# Patient Record
Sex: FEMALE | Race: WHITE | NOT HISPANIC OR LATINO | Employment: PART TIME | URBAN - METROPOLITAN AREA
[De-identification: names, ages, dates, MRNs, and addresses within clinical notes are randomized per-mention and may not be internally consistent; named-entity substitution may affect disease eponyms.]

---

## 2018-02-28 ENCOUNTER — LAB REQUISITION (OUTPATIENT)
Dept: LAB | Facility: HOSPITAL | Age: 43
End: 2018-02-28

## 2018-02-28 DIAGNOSIS — Z00.00 ROUTINE GENERAL MEDICAL EXAMINATION AT A HEALTH CARE FACILITY: ICD-10-CM

## 2018-03-07 ENCOUNTER — OFFICE VISIT (OUTPATIENT)
Dept: FAMILY MEDICINE CLINIC | Facility: CLINIC | Age: 43
End: 2018-03-07

## 2018-03-07 VITALS
TEMPERATURE: 98.5 F | SYSTOLIC BLOOD PRESSURE: 121 MMHG | OXYGEN SATURATION: 98 % | WEIGHT: 189.6 LBS | HEIGHT: 65 IN | HEART RATE: 81 BPM | DIASTOLIC BLOOD PRESSURE: 80 MMHG | RESPIRATION RATE: 14 BRPM | BODY MASS INDEX: 31.59 KG/M2

## 2018-03-07 DIAGNOSIS — Z00.00 ROUTINE GENERAL MEDICAL EXAMINATION AT A HEALTH CARE FACILITY: Primary | ICD-10-CM

## 2018-03-07 PROBLEM — J45.40 MODERATE PERSISTENT ASTHMA WITHOUT COMPLICATION: Status: ACTIVE | Noted: 2018-03-07

## 2018-03-07 PROCEDURE — 99396 PREV VISIT EST AGE 40-64: CPT | Performed by: PHYSICIAN ASSISTANT

## 2018-03-07 PROCEDURE — 93000 ELECTROCARDIOGRAM COMPLETE: CPT | Performed by: PHYSICIAN ASSISTANT

## 2018-03-07 NOTE — PROGRESS NOTES
Subjective   Geno Jimenez is a 43 y.o. female  Annual Exam (Pt is fasting. UTD on Pap, due for MMG. )      History of Present Illness  Patient is a pleasant 43-year-old white female comes in for full preventive medical examination.  Denies any new problems or complaints.  Does use Advair for asthma  The following portions of the patient's history were reviewed and updated as appropriate: allergies, current medications, past social history and problem list    Review of Systems   Constitutional: Negative.    HENT: Negative.    Eyes: Negative.    Respiratory: Negative.    Cardiovascular: Negative.    Gastrointestinal: Negative.    Endocrine: Negative.    Genitourinary: Negative.    Musculoskeletal: Negative.    Skin: Negative.    Allergic/Immunologic: Negative.    Neurological: Negative.    Hematological: Negative.    Psychiatric/Behavioral: Negative.    All other systems reviewed and are negative.      Objective     Vitals:    03/07/18 0943   BP: 121/80   Pulse: 81   Resp: 14   Temp: 98.5 °F (36.9 °C)   SpO2: 98%       Physical Exam   Constitutional: She is oriented to person, place, and time. She appears well-developed and well-nourished.   HENT:   Head: Normocephalic and atraumatic.   Right Ear: External ear normal.   Left Ear: External ear normal.   Nose: Nose normal.   Mouth/Throat: Oropharynx is clear and moist.   Eyes: Conjunctivae and EOM are normal. Pupils are equal, round, and reactive to light.   Neck: Normal range of motion. Neck supple. No JVD present. Carotid bruit is not present. No thyromegaly present.   Cardiovascular: Normal rate, regular rhythm, normal heart sounds and intact distal pulses.    No murmur heard.  Pulmonary/Chest: Effort normal and breath sounds normal.   Abdominal: Soft. Bowel sounds are normal. She exhibits no mass. There is no tenderness.   Musculoskeletal: Normal range of motion. She exhibits no edema.   Lymphadenopathy:     She has no cervical adenopathy.   Neurological: She  is alert and oriented to person, place, and time. She has normal reflexes. No cranial nerve deficit.   Skin: Skin is warm and dry.   Psychiatric: She has a normal mood and affect.   Nursing note and vitals reviewed.    ECG 12 Lead  Date/Time: 3/7/2018 10:17 AM  Performed by: NANNETTE WILLAMS  Authorized by: NANNETTE WILLAMS   Comparison: not compared with previous ECG   Rhythm: sinus rhythm  Conduction: conduction normal  ST Segments: ST segments normal  T Waves: T waves normal  Clinical impression: normal ECG  Comments: nsr            Assessment/Plan     Diagnoses and all orders for this visit:    Routine general medical examination at a health care facility  -     CBC & Differential  -     TSH  -     Comprehensive Metabolic Panel  -     Lipid Panel  -     Vitamin D 1,25 Dihydroxy  Preventive medicine discussed, diet, exercise healthy living discussed.  Discussed ways improve diet and exercise discussed low-calorie low-carb diet along with exercise 3-5 times per week to give prescription for phentermine 37.5 mg 1 by mouth everyday dispense 30.  And weight loss goal of 10% of her body weight

## 2018-03-10 ENCOUNTER — APPOINTMENT (OUTPATIENT)
Dept: LAB | Facility: HOSPITAL | Age: 43
End: 2018-03-10

## 2018-03-10 LAB
ALBUMIN SERPL-MCNC: 4.7 G/DL (ref 3.2–4.8)
ALBUMIN/GLOB SERPL: 1.5 G/DL (ref 1.5–2.5)
ALP SERPL-CCNC: 87 U/L (ref 25–100)
ALT SERPL W P-5'-P-CCNC: 28 U/L (ref 7–40)
ANION GAP SERPL CALCULATED.3IONS-SCNC: 7 MMOL/L (ref 3–11)
ARTICHOKE IGE QN: 171 MG/DL (ref 0–130)
AST SERPL-CCNC: 32 U/L (ref 0–33)
BASOPHILS # BLD AUTO: 0.03 10*3/MM3 (ref 0–0.2)
BASOPHILS NFR BLD AUTO: 0.4 % (ref 0–1)
BILIRUB SERPL-MCNC: 0.7 MG/DL (ref 0.3–1.2)
BUN BLD-MCNC: 10 MG/DL (ref 9–23)
BUN/CREAT SERPL: 11.1 (ref 7–25)
CALCIUM SPEC-SCNC: 9.3 MG/DL (ref 8.7–10.4)
CHLORIDE SERPL-SCNC: 103 MMOL/L (ref 99–109)
CHOLEST SERPL-MCNC: 240 MG/DL (ref 0–200)
CO2 SERPL-SCNC: 27 MMOL/L (ref 20–31)
CREAT BLD-MCNC: 0.9 MG/DL (ref 0.6–1.3)
DEPRECATED RDW RBC AUTO: 42.6 FL (ref 37–54)
EOSINOPHIL # BLD AUTO: 0.22 10*3/MM3 (ref 0–0.3)
EOSINOPHIL NFR BLD AUTO: 2.6 % (ref 0–3)
ERYTHROCYTE [DISTWIDTH] IN BLOOD BY AUTOMATED COUNT: 12.5 % (ref 11.3–14.5)
GFR SERPL CREATININE-BSD FRML MDRD: 68 ML/MIN/1.73
GLOBULIN UR ELPH-MCNC: 3.2 GM/DL
GLUCOSE BLD-MCNC: 110 MG/DL (ref 70–100)
HCT VFR BLD AUTO: 43.4 % (ref 34.5–44)
HDLC SERPL-MCNC: 72 MG/DL (ref 40–60)
HGB BLD-MCNC: 14.6 G/DL (ref 11.5–15.5)
IMM GRANULOCYTES # BLD: 0.02 10*3/MM3 (ref 0–0.03)
IMM GRANULOCYTES NFR BLD: 0.2 % (ref 0–0.6)
LYMPHOCYTES # BLD AUTO: 2.14 10*3/MM3 (ref 0.6–4.8)
LYMPHOCYTES NFR BLD AUTO: 25.2 % (ref 24–44)
MCH RBC QN AUTO: 31.3 PG (ref 27–31)
MCHC RBC AUTO-ENTMCNC: 33.6 G/DL (ref 32–36)
MCV RBC AUTO: 92.9 FL (ref 80–99)
MONOCYTES # BLD AUTO: 0.78 10*3/MM3 (ref 0–1)
MONOCYTES NFR BLD AUTO: 9.2 % (ref 0–12)
NEUTROPHILS # BLD AUTO: 5.29 10*3/MM3 (ref 1.5–8.3)
NEUTROPHILS NFR BLD AUTO: 62.4 % (ref 41–71)
PLATELET # BLD AUTO: 343 10*3/MM3 (ref 150–450)
PMV BLD AUTO: 10 FL (ref 6–12)
POTASSIUM BLD-SCNC: 4 MMOL/L (ref 3.5–5.5)
PROT SERPL-MCNC: 7.9 G/DL (ref 5.7–8.2)
RBC # BLD AUTO: 4.67 10*6/MM3 (ref 3.89–5.14)
SODIUM BLD-SCNC: 137 MMOL/L (ref 132–146)
TRIGL SERPL-MCNC: 56 MG/DL (ref 0–150)
TSH SERPL DL<=0.05 MIU/L-ACNC: 1.35 MIU/ML (ref 0.35–5.35)
WBC NRBC COR # BLD: 8.48 10*3/MM3 (ref 3.5–10.8)

## 2018-03-10 PROCEDURE — 80061 LIPID PANEL: CPT | Performed by: PHYSICIAN ASSISTANT

## 2018-03-10 PROCEDURE — 80053 COMPREHEN METABOLIC PANEL: CPT | Performed by: PHYSICIAN ASSISTANT

## 2018-03-10 PROCEDURE — 84443 ASSAY THYROID STIM HORMONE: CPT | Performed by: PHYSICIAN ASSISTANT

## 2018-03-10 PROCEDURE — 85025 COMPLETE CBC W/AUTO DIFF WBC: CPT | Performed by: PHYSICIAN ASSISTANT

## 2018-03-10 PROCEDURE — 36415 COLL VENOUS BLD VENIPUNCTURE: CPT | Performed by: PHYSICIAN ASSISTANT

## 2018-03-10 PROCEDURE — 82652 VIT D 1 25-DIHYDROXY: CPT | Performed by: PHYSICIAN ASSISTANT

## 2018-03-12 LAB — 1,25(OH)2D3 SERPL-MCNC: 67.5 PG/ML (ref 19.9–79.3)

## 2019-11-21 ENCOUNTER — TELEPHONE (OUTPATIENT)
Dept: FAMILY MEDICINE CLINIC | Facility: CLINIC | Age: 44
End: 2019-11-21

## 2019-11-21 NOTE — TELEPHONE ENCOUNTER
Patient's  called to request for a new, updated prescription for the patient for the ADVAIR DISKUS 250-50 MCG/DOSE DISKUS to be sent to the Department of [facility] on [address] in [place], [place]. The patient's  stated that they are currently stationed there and the patient is almost out of her inhaler. If there are any questions or concerns please call the patient's  back at [phone] or the patient at [phone] or the pharmacy at [facility] at [phone]. The patient's  requested a call at [phone] when this message has been received and the prescription has been sent to the pharmacy so that he knows that it is there.

## 2020-02-28 NOTE — TELEPHONE ENCOUNTER
PT CALLED REQUESTING  A 12 MONTH REFILL ON fluticasone-salmeterol (ADVAIR DISKUS) 250-50 MCG/DOSE DISKUS.        PHARMACY CONFIRMED- [facility]        PLEASE CALL  AND ADVISE PT @ [phone]

## 2020-07-23 ENCOUNTER — TELEPHONE (OUTPATIENT)
Dept: FAMILY MEDICINE CLINIC | Facility: CLINIC | Age: 45
End: 2020-07-23

## 2020-07-23 NOTE — TELEPHONE ENCOUNTER
Pt's  called to request an order for Advair not the generic     fluticasone-salmeterol (ADVAIR DISKUS) 250-50 MCG/DOSE DISKUS    Pt's insurance stated that pcp will need to send a new order       [facility]   PH: [phone]  FAX: [phone]

## 2021-12-03 NOTE — TELEPHONE ENCOUNTER
Caller: Binu Jimenez    Relationship: Emergency Contact    Best call back number: [phone]    Requested Prescriptions:   Requested Prescriptions     Pending Prescriptions Disp Refills   • Advair Diskus 250-50 MCG/DOSE DISKUS 180 each 3     Sig: Inhale 1 puff 2 (Two) Times a Day.        Pharmacy where request should be sent: [facility] [address] [phone] [facility] [phone]      Additional details provided by patient: PATIENTS PRESCRIPTION HAS . PATIENTS PHARMACY NEEDS A NEW PRESCRIPTION FOR HER YEARLY SUPPLY     Does the patient have less than a 3 day supply:  [x] Yes  [] No    Aravind Agosto Rep   21 09:57 EST

## 2023-03-24 RX ORDER — FLUTICASONE PROPIONATE AND SALMETEROL 250; 50 UG/1; UG/1
1 POWDER RESPIRATORY (INHALATION) 2 TIMES DAILY
Qty: 180 EACH | Refills: 3 | OUTPATIENT
Start: 2023-03-24

## 2023-03-24 NOTE — TELEPHONE ENCOUNTER
Caller: Jimbo Jimenezy    Relationship: Emergency Contact    Best call back number: [phone]    Requested Prescriptions:   Requested Prescriptions     Pending Prescriptions Disp Refills   • Fluticasone-Salmeterol (Advair Diskus) 250-50 MCG/ACT DISKUS 180 each 3     Sig: Inhale 1 puff 2 (Two) Times a Day.        Pharmacy where request should be sent: [address] [phone] [facility] [phone]      Last office visit with prescribing clinician: Visit date not found   Last telemedicine visit with prescribing clinician: Visit date not found   Next office visit with prescribing clinician: Visit date not found     Additional details provided by patient:     Does the patient have less than a 3 day supply:  [] Yes  [x] No    Would you like a call back once the refill request has been completed: [] Yes [x] No    If the office needs to give you a call back, can they leave a voicemail: [x] Yes [] No    Cadance Dunaway, RegSched Rep   03/24/23 12:19 EDT

## 2023-04-12 NOTE — TELEPHONE ENCOUNTER
Caller: Binu Jimenez    Relationship: Emergency Contact    Best call back number: [phone]    Requested Prescriptions:   Requested Prescriptions     Pending Prescriptions Disp Refills   • Fluticasone-Salmeterol (Advair Diskus) 250-50 MCG/ACT DISKUS 180 each 3     Sig: Inhale 1 puff 2 (Two) Times a Day.        Pharmacy where request should be sent: [facility] - [address] [phone] [facility] [phone]      Last office visit with prescribing clinician: Visit date not found   Last telemedicine visit with prescribing clinician: Visit date not found   Next office visit with prescribing clinician: Visit date not found     Additional details provided by patient: PATIENTS  IS CALLING AND STATES THEY HAVE TRIED TO GET THIS REFILLED BUT WAS TOLD SHE HAS TO HAVE AN IN PERSON VISIT BUT THEY ARE STATIONED IN [place] FOR THE [employer] AND ARE TRYING TO FIND A DOCTOR THERE THAT COULD REFILL THIS BUT THERE IS A WAIT SO THEY WOULD LIKE TO KNOW IF SHE CAN GET AN EMERGENCY REFILL OF THIS.      Does the patient have less than a 3 day supply:  [] Yes  [x] No    Would you like a call back once the refill request has been completed: [] Yes [x] No    If the office needs to give you a call back, can they leave a voicemail: [x] Yes [] No    Cadance Dunaway, RegSched Rep   04/12/23 12:44 EDT

## 2023-04-14 RX ORDER — FLUTICASONE PROPIONATE AND SALMETEROL 250; 50 UG/1; UG/1
1 POWDER RESPIRATORY (INHALATION) 2 TIMES DAILY
Qty: 180 EACH | Refills: 3 | Status: SHIPPED | OUTPATIENT
Start: 2023-04-14

## 2024-07-29 RX ORDER — FLUTICASONE PROPIONATE AND SALMETEROL 250; 50 UG/1; UG/1
1 POWDER RESPIRATORY (INHALATION) 2 TIMES DAILY
Qty: 180 EACH | Refills: 3 | OUTPATIENT
Start: 2024-07-29

## 2024-07-29 NOTE — TELEPHONE ENCOUNTER
Caller: Binu Jimenez    Relationship: Emergency Contact    Best call back number: [phone]     Requested Prescriptions: NEEDS TO BE BRAND NAME   Requested Prescriptions     Pending Prescriptions Disp Refills    Fluticasone-Salmeterol (Advair Diskus) 250-50 MCG/ACT DISKUS 180 each 3     Sig: Inhale 1 puff 2 (Two) Times a Day.        Pharmacy where request should be sent: [address] [phone] [facility] [phone]      Last office visit with prescribing clinician: Visit date not found   Last telemedicine visit with prescribing clinician: Visit date not found   Next office visit with prescribing clinician: Visit date not found     Additional details provided by patient: NEEDS TO BE BRAND NAME     Does the patient have less than a 3 day supply:  [] Yes  [x] No    Would you like a call back once the refill request has been completed: [x] Yes [] No    If the office needs to give you a call back, can they leave a voicemail: [x] Yes [] No    Aravind Rushing   07/29/24 11:35 EDT

## 2024-07-31 NOTE — TELEPHONE ENCOUNTER
Hub staff attempted to follow warm transfer process and was unsuccessful     Caller: Binu Jimenez    Relationship to patient: Emergency Contact    Best call back number: [phone]    Patient is needing: THE PATIENTS  IS CALLING IN TO CHECK THE STATUS OF THE REFILL REQUEST HE NEEDS TO KNOW WHY IT WAS REFUSED

## 2024-08-06 ENCOUNTER — TELEPHONE (OUTPATIENT)
Dept: FAMILY MEDICINE CLINIC | Facility: CLINIC | Age: 49
End: 2024-08-06
Payer: COMMERCIAL

## 2024-08-06 NOTE — TELEPHONE ENCOUNTER
WOULD LIKE TO SPEAK TO A MANGER BECAUSE DOCTOR WILL NOT FILL A MED REQUEST BECAUSE THE PT/HIS WIFE HAS NOT BEEN SEEN IN OVER 6 YEARS BUT GOT A MED REFILL LAST YEAR

## 2024-08-07 ENCOUNTER — DOCUMENTATION (OUTPATIENT)
Dept: FAMILY MEDICINE CLINIC | Facility: CLINIC | Age: 49
End: 2024-08-07
Payer: COMMERCIAL

## 2024-08-07 NOTE — PROGRESS NOTES
Patients  Binu Jimenez called requesting a refill of his wifes Advair, we denied the refill because we have not seen this patient in our office for 6 years. We did do a one time courtesy refill on 4/12/23 of the Advair (15 mo ago) as patient  had been transferred to [facility] for  and were on a waiting list for a primary care.  I explained that we would not be filling the Advair and that his wife needed to establish care in [place]. Left voicemail 8/8/24 2:20pm